# Patient Record
Sex: FEMALE | Race: WHITE | Employment: FULL TIME | URBAN - METROPOLITAN AREA
[De-identification: names, ages, dates, MRNs, and addresses within clinical notes are randomized per-mention and may not be internally consistent; named-entity substitution may affect disease eponyms.]

---

## 2023-02-16 ENCOUNTER — OFFICE VISIT (OUTPATIENT)
Dept: OCCUPATIONAL MEDICINE | Age: 33
End: 2023-02-16

## 2023-02-16 VITALS — SYSTOLIC BLOOD PRESSURE: 138 MMHG | HEART RATE: 90 BPM | OXYGEN SATURATION: 99 % | DIASTOLIC BLOOD PRESSURE: 78 MMHG

## 2023-02-16 DIAGNOSIS — Z02.1 ENCOUNTER FOR PRE-EMPLOYMENT EXAMINATION: Primary | ICD-10-CM

## 2023-02-16 RX ORDER — PHENTERMINE HYDROCHLORIDE 37.5 MG/1
37.5 TABLET ORAL
COMMUNITY
Start: 2023-02-16 | End: 2023-03-18

## 2023-02-16 ASSESSMENT — ENCOUNTER SYMPTOMS
CHEST TIGHTNESS: 0
NAUSEA: 0
DIARRHEA: 0
VOMITING: 0
SHORTNESS OF BREATH: 0
ABDOMINAL PAIN: 0

## 2023-02-16 ASSESSMENT — VISUAL ACUITY
OD_CC: 20/13
OS_CC: 20/13

## 2023-02-16 NOTE — PROGRESS NOTES
PROGRESS NOTE    SUBJECTIVE:   Yvette Haley is a 32 y.o. female seen for  post employment physical.    Chief Complaint    Employment Physical         Ms. Haley is a 33 y/o female with no significant PMH who is here for a post offer employment physical. She verbalizes no concerns or complaints today. She does take Adipex for weight loss- was prescribed by her current PCP. She denies any side effects currently but is aware.     She is aware she will complete a drug screen and  hearing exam today.      Current Outpatient Medications   Medication Sig Dispense Refill    phentermine (ADIPEX-P) 37.5 MG tablet Take 37.5 mg by mouth every morning (before breakfast).       No current facility-administered medications for this visit.      No Known Allergies    Social History     Tobacco Use    Smoking status: Every Day     Packs/day: 1.00     Years: 15.00     Pack years: 15.00     Types: Cigarettes    Smokeless tobacco: Never        Review of Systems   Constitutional:  Negative for fatigue and fever.   HENT:  Negative for ear discharge and ear pain.    Eyes:  Negative for visual disturbance.   Respiratory:  Negative for chest tightness and shortness of breath.    Cardiovascular:  Negative for chest pain and palpitations.   Gastrointestinal:  Negative for abdominal pain, diarrhea, nausea and vomiting.   Musculoskeletal:  Negative for gait problem.   Neurological:  Negative for dizziness, weakness and headaches.   Psychiatric/Behavioral:  Negative for confusion. The patient is not nervous/anxious.         OBJECTIVE:  /78 (Site: Left Upper Arm)   Pulse 90   LMP 02/09/2023 (Exact Date)   SpO2 99%      No results found for this visit on 02/16/23.    Physical Exam  Constitutional:       Appearance: Normal appearance.   HENT:      Right Ear: Tympanic membrane and ear canal normal.      Left Ear: Tympanic membrane and ear canal normal.   Cardiovascular:      Rate and Rhythm: Normal rate and regular rhythm.      Heart  sounds: Normal heart sounds. Pulmonary:      Effort: Pulmonary effort is normal.      Breath sounds: Normal breath sounds. Musculoskeletal:         General: Normal range of motion. Skin:     General: Skin is warm and dry. Neurological:      Mental Status: She is alert and oriented to person, place, and time. Psychiatric:         Mood and Affect: Mood normal.         Behavior: Behavior normal.       ASSESSMENT and [place] was seen today for employment physical.    Diagnoses and all orders for this visit:    Encounter for pre-employment examination  Comments:  Drug screen and hearing exam completed. Encouraged to utilize clinic when needed. Counseled on benefits of having a primary care provider which includes, but is not limited to, continuity of care and having a medical home when concerns arise. Also enforced that onsite clinic policy states that we are not to take the place of a primary care provider, pt verbalized understanding. SEs and risk vs benefits associated with medications prescribed discussed with patient who verbalized understanding. Pt verbalized understanding and agreement with plan of care. RTC for persisting/worsening symptoms or new complaints that arise. Discussed signs and symptoms that would warrant immediate evaluation including, but not limited to HA, blurred vision, speech disturbance, difficulty with ambulation/gait, numbness, tingling, weakness, syncope, chest pain, or shortness of breath. I have reviewed the patient's medication list, past medical, family, social, and surgical history in detail and updated the patient record appropriately.     CHRISTOPHER Diaz - CNP

## 2023-02-22 ENCOUNTER — OFFICE VISIT (OUTPATIENT)
Dept: OCCUPATIONAL MEDICINE | Age: 33
End: 2023-02-22

## 2023-02-22 DIAGNOSIS — H57.89 IRRITATION OF RIGHT EYE: Primary | ICD-10-CM

## 2023-02-22 ASSESSMENT — ENCOUNTER SYMPTOMS
EYE DISCHARGE: 0
EYE REDNESS: 1
EYE ITCHING: 0
DOUBLE VISION: 0
EYE PAIN: 1
BLURRED VISION: 0

## 2023-02-22 NOTE — PROGRESS NOTES
PROGRESS NOTE    SUBJECTIVE:   Viraj Andujar is a 27 y.o. female seen for right eye irritation. Chief Complaint    Eye Problem         Aly Hartman is here for right eye pain and irritation. She relays it started yesterday but is unaware of anything getting into her eye. She endorses mild right eye pain \"where it feels like something is scratching\". Denies fevers, discharge, crusting, edema, or visual differences. She wears glasses. She has tried Visine eye drops at home twice daily since it started. She does see an ophthalmologist.     Eye Problem   The right eye is affected. This is a new problem. The current episode started yesterday. The problem occurs constantly. The problem has been unchanged. The injury mechanism is unknown. The pain is mild. There is No known exposure to pink eye. She Does not wear contacts. Associated symptoms include eye redness. Pertinent negatives include no blurred vision, eye discharge, double vision, fever or itching. She has tried eye drops for the symptoms. The treatment provided no relief. Current Outpatient Medications   Medication Sig Dispense Refill    phentermine (ADIPEX-P) 37.5 MG tablet Take 37.5 mg by mouth every morning (before breakfast). No current facility-administered medications for this visit. No Known Allergies    Social History     Tobacco Use    Smoking status: Every Day     Packs/day: 1.00     Years: 15.00     Pack years: 15.00     Types: Cigarettes    Smokeless tobacco: Never        Review of Systems   Constitutional:  Negative for fever. Eyes:  Positive for pain and redness. Negative for blurred vision, double vision, discharge, itching and visual disturbance. OBJECTIVE:  [facility] 02/09/2023 (Exact Date)      No results found for this visit on 02/22/23. Physical Exam  Constitutional:       Appearance: Normal appearance. Eyes:      General:         Right eye: No foreign body.       Conjunctiva/sclera:      Right eye: Right conjunctiva is injected. Left eye: Left conjunctiva is not injected. Comments: Right sclera and lower conjunctiva are injected. No obvious foreign body or scratch noted with light. Neurological:      Mental Status: She is alert. ASSESSMENT and PLAN    Sebastien Gray was seen today for eye problem. Diagnoses and all orders for this visit:    Irritation of right eye  Comments:  Offered sterile saline flush- patient stated she could try at home. Encouraged to call ophthalmology office for in-depth exam that clinic cannot offer. I offered to flush her right eye with sterile saline- patient stated she could do it at home. Sterile saline bottle provided to patient. Encouraged to call ophthalmology office for more in-depth exam as some scratches or foreign bodies cannot be seen with regular light. Encouraged to RTC for any questions or concerns. Patient verbalized understanding. Counseled on benefits of having a primary care provider which includes, but is not limited to, continuity of care and having a medical home when concerns arise. Also enforced that onsite clinic policy states that we are not to take the place of a primary care provider, pt verbalized understanding. SEs and risk vs benefits associated with medications prescribed discussed with patient who verbalized understanding. Pt verbalized understanding and agreement with plan of care. RTC for persisting/worsening symptoms or new complaints that arise. Discussed signs and symptoms that would warrant immediate evaluation including, but not limited to HA, blurred vision, speech disturbance, difficulty with ambulation/gait, numbness, tingling, weakness, syncope, chest pain, or shortness of breath. I have reviewed the patient's medication list, past medical, family, social, and surgical history in detail and updated the patient record appropriately.     Kim Lynch, APRN - CNP

## 2025-03-27 ENCOUNTER — APPOINTMENT (OUTPATIENT)
Dept: URBAN - NONMETROPOLITAN AREA CLINIC 1 | Facility: CLINIC | Age: 35
Setting detail: DERMATOLOGY
End: 2025-03-27

## 2025-03-27 DIAGNOSIS — L82.1 OTHER SEBORRHEIC KERATOSIS: ICD-10-CM | Status: INADEQUATELY CONTROLLED

## 2025-03-27 PROBLEM — D48.5 NEOPLASM OF UNCERTAIN BEHAVIOR OF SKIN: Status: ACTIVE | Noted: 2025-03-27

## 2025-03-27 PROCEDURE — ? COUNSELING

## 2025-03-27 PROCEDURE — ? OBSERVATION

## 2025-03-27 PROCEDURE — 99202 OFFICE O/P NEW SF 15 MIN: CPT

## 2025-03-27 ASSESSMENT — LOCATION SIMPLE DESCRIPTION DERM: LOCATION SIMPLE: RIGHT BUTTOCK

## 2025-03-27 ASSESSMENT — LOCATION DETAILED DESCRIPTION DERM: LOCATION DETAILED: RIGHT BUTTOCK

## 2025-03-27 ASSESSMENT — LOCATION ZONE DERM: LOCATION ZONE: TRUNK

## 2025-03-27 NOTE — HPI: SKIN LESION
What Type Of Note Output Would You Prefer (Optional)?: Standard Output
How Severe Is Your Skin Lesion?: moderate
Has Your Skin Lesion Been Treated?: not been treated
Is This A New Presentation, Or A Follow-Up?: Skin Lesion
Additional History: Pt has a dark raised spot she would like looked at.